# Patient Record
Sex: MALE | Race: WHITE
[De-identification: names, ages, dates, MRNs, and addresses within clinical notes are randomized per-mention and may not be internally consistent; named-entity substitution may affect disease eponyms.]

---

## 2019-03-26 ENCOUNTER — HOSPITAL ENCOUNTER (OUTPATIENT)
Dept: HOSPITAL 74 - FASUSAT | Age: 59
Discharge: HOME | End: 2019-03-26
Attending: ORTHOPAEDIC SURGERY
Payer: COMMERCIAL

## 2019-03-26 VITALS — DIASTOLIC BLOOD PRESSURE: 76 MMHG | SYSTOLIC BLOOD PRESSURE: 120 MMHG | HEART RATE: 66 BPM

## 2019-03-26 VITALS — TEMPERATURE: 97.7 F

## 2019-03-26 VITALS — BODY MASS INDEX: 27.6 KG/M2

## 2019-03-26 DIAGNOSIS — M17.11: Primary | ICD-10-CM

## 2019-03-26 DIAGNOSIS — Z53.8: ICD-10-CM

## 2019-03-26 PROCEDURE — 0SRC0LZ REPLACEMENT OF RIGHT KNEE JOINT WITH MEDIAL UNICONDYLAR SYNTHETIC SUBSTITUTE, OPEN APPROACH: ICD-10-PCS | Performed by: ORTHOPAEDIC SURGERY

## 2019-03-26 NOTE — HP
Satellite [facility]





- Chief Complaint


Chief Complaint: right knee pain





- Past Medical History


Allergies/Adverse Reactions: 


 Allergies











Allergy/AdvReac Type Severity Reaction Status Date / Time


 


shellfish derived Allergy Severe Difficulty Verified 03/18/19 12:15





   Breathing  


 


Penicillins Allergy Unknown  Verified 03/18/19 12:15














- Current Medications


Current Medications: 


 Home Medications











 Medication  Instructions  Recorded


 


Atorvastatin Ca [Lipitor] 40 mg PO HS 03/18/19


 


Biotin 2,500 mcg PO DAILY 03/18/19


 


Multivitamin [One-Daily 1 each PO DAILY 03/18/19





Multi-Vitamin]  














Satellite Physical Exam





- Physical Examination


General Appearance: Well Nourished, Well Developed, Alert & Oriented x3


ENT: Clear


Lung: Normal air movement


Heart: Regular rate & rhythm


Extremities: Other (right knee- + swelling, + ttp medially, decr rom, nvi xrays 

show grade 4 medial djd)


Neurological: Intact, Alert, Oriented





Satellite Impression/Plan





- Impression/Plan


Impression: right knee medial djd


Operative Procedure: right medial noemi ukr


Date to be Performed: 03/26/19

## 2019-03-26 NOTE — OP
DATE OF OPERATION:  03/26/2019

 

PREOPERATIVE DIAGNOSIS:  Degenerative joint disease, right knee.

 

POSTOPERATIVE DIAGNOSIS:  Degenerative joint disease, right knee.

 

PROCEDURE:  Attempted right unicompartmental knee replacement. 

 

SURGICAL ATTENDING:  Curt Oquendo MD

 

FIRST ASSISTANT:  ZACKARY Knapp

 

ANESTHESIA:  Regional and spinal.

 

DESCRIPTION OF OPERATIVE PROCEDURE:  Patient was taken to the operating room on March 26, 2019.  Regional and spinal anesthesia was performed by the anesthesiologist.  IV

Kefzol was administered prophylactically prior to the case.  We were about to begin

prepping the patient on the operating table when a power issue developed with the

JAYA robot with shrestha emanating from the power cord on the back part of the robot. 

The robot lost power and was not able to continue to perform the case.  We elected at

this time to abort the case despite the patient already receiving anesthesia due to

the case of the robot not functioning, and it was not a safe procedure.  We elected

to abort the case and to bring back the patient at a later date when the robot would

be functioning appropriately.  Patient was awakened from his sedation, transferred to

the recovery in stable condition.  

 

 

CURT OQUENDO M.D.

 

ANA CRISTINA/[id number]

DD: 03/26/2019 10:54

DT: 03/26/2019 11:07

Job #:  [id number]

## 2019-04-02 ENCOUNTER — HOSPITAL ENCOUNTER (OUTPATIENT)
Dept: HOSPITAL 74 - FM/S | Age: 59
LOS: 1 days | Discharge: HOME HEALTH | End: 2019-04-03
Attending: ORTHOPAEDIC SURGERY
Payer: COMMERCIAL

## 2019-04-02 VITALS — BODY MASS INDEX: 27.6 KG/M2

## 2019-04-02 DIAGNOSIS — M17.11: Primary | ICD-10-CM

## 2019-04-02 PROCEDURE — 0SRC0L9 REPLACEMENT OF RIGHT KNEE JOINT WITH MEDIAL UNICONDYLAR SYNTHETIC SUBSTITUTE, CEMENTED, OPEN APPROACH: ICD-10-PCS | Performed by: ORTHOPAEDIC SURGERY

## 2019-04-02 PROCEDURE — 8E0Y0CZ ROBOTIC ASSISTED PROCEDURE OF LOWER EXTREMITY, OPEN APPROACH: ICD-10-PCS | Performed by: ORTHOPAEDIC SURGERY

## 2019-04-02 PROCEDURE — 27446 REVISION OF KNEE JOINT: CPT

## 2019-04-02 PROCEDURE — 20985 CPTR-ASST DIR MS PX: CPT

## 2019-04-02 PROCEDURE — 8E0YXBZ COMPUTER ASSISTED PROCEDURE OF LOWER EXTREMITY: ICD-10-PCS | Performed by: ORTHOPAEDIC SURGERY

## 2019-04-02 RX ADMIN — OXYCODONE HYDROCHLORIDE SCH MG: 10 TABLET, FILM COATED, EXTENDED RELEASE ORAL at 21:21

## 2019-04-02 RX ADMIN — CELECOXIB ONE: 200 CAPSULE ORAL at 20:14

## 2019-04-02 RX ADMIN — DOCUSATE SODIUM,SENNOSIDES SCH TABLET: 50; 8.6 TABLET, FILM COATED ORAL at 21:22

## 2019-04-02 RX ADMIN — GABAPENTIN ONE: 300 CAPSULE ORAL at 20:14

## 2019-04-02 RX ADMIN — GABAPENTIN ONE MG: 300 CAPSULE ORAL at 08:19

## 2019-04-02 RX ADMIN — OXYCODONE HYDROCHLORIDE ONE MG: 10 TABLET, FILM COATED, EXTENDED RELEASE ORAL at 08:19

## 2019-04-02 RX ADMIN — ACETAMINOPHEN SCH: 325 TABLET ORAL at 20:15

## 2019-04-02 RX ADMIN — ACETAMINOPHEN SCH MG: 325 TABLET ORAL at 17:33

## 2019-04-02 RX ADMIN — ACETAMINOPHEN SCH MG: 325 TABLET ORAL at 23:23

## 2019-04-02 RX ADMIN — OXYCODONE HYDROCHLORIDE ONE: 10 TABLET, FILM COATED, EXTENDED RELEASE ORAL at 20:14

## 2019-04-02 RX ADMIN — CELECOXIB ONE MG: 200 CAPSULE ORAL at 08:19

## 2019-04-02 NOTE — SPEC
DATE OF OPERATION:  04/02/2019

 

PREOPERATIVE DIAGNOSIS:  Degenerative joint disease, right knee.

 

POSTOPERATIVE DIAGNOSIS:  Degenerative joint disease, right knee.

 

PROCEDURE:  Right medial unicompartmental knee replacement with robotic-assisted

navigation (MAKOplasty) and patelloplasty.

 

SURGICAL ATTENDING:  John Oquendo MD

 

FIRST ASSISTANT:  ZACKARY Knapp

 

ANESTHESIA:  Regional and spinal.

 

CLOSURE:  Medial JAYA components with a 6 femur, 6 tibia, and an 8 polyethylene; No.

1 Vicryl, fascia; 0 and 2-0 subcutaneous; 3-0 Monocryl subcuticular with skin glue

for skin; 4-0 undyed Vicryl for pin sites.

 

ESTIMATED BLOOD LOSS:  Less than 100 mL.

 

COMPLICATIONS:  None.

 

CONDITION:  To recovery in stable condition.

 

DESCRIPTION OF OPERATIVE PROCEDURE:  Patient was taken to the operating room on April 2, 2019.  Spinal and regional anesthesia was administered by the anesthesiologist. 

IV Kefzol and TXA were administered prophylactically prior to the case.  A

well-padded pneumatic tourniquet was placed on the right proximal thigh.   The right

lower extremity was prepped and draped in the usual sterile fashion.  A 6- to 8-cm

longitudinal incision over the medial side of the patella from mid patella to the

tibial tubercle was incised and was deepened using Bovie cautery.  An arthrotomy was

then made just medial to the patellar tendon and the patella.  Subperiosteal

dissection was done on the anteromedial proximal tibia all the way back to the MCL. 

Partial fat pad excision was performed, exposing the medial compartment.  Checkpoint

was malleable at both the femur and the tibia.  Using 2 stab incisions in the femur 1

handbreadth above the patella on the femur and 2 stab incisions 1 handbreadth below

the tibial tubercle on the tibia, 2 threaded pins were drilled in parallel fashion

from anterior to posterior, going through the proximal cortex and engaging the 2nd

but not through the 2nd cortex.  To these threaded pins were fastened navigation

rays, 1 on the femur and 1 on the tibia.  The knee was then registered with the

navigation device with the center of the rotation of the hip, medial and lateral

malleoli, and multiple points both on the femur and on the tibia.  Excellent

registration of less than 0.5 mm was obtained on both to ensure adequate

registration.  The navigation device ensured us to "pop the bubbles" both on the

femur and the tibia and that was performed and passed registration.

 

The knee was then thoroughly inspected to remove all osteophytes both on the femur

and the tibia.  Also, osteophytes on the trochlea and on the surface of the patella

were removed as well.  The knee was then stressed with valgus stress at 0, 30, 60,

90, and 120 degrees of flexion.  This propagated a looseness/tightness graft.  The

virtual positions of the components were then optimized to ensure an excellent graft.

 The tracking also was optimized by manipulating the virtual position to ensure that

the femoral component articulated with the central portion of the tibial component. 

The robot was then brought into the field and was registered.  The robot was used to

bur the bone on both the femur and the tibia as to the specifications of the

components.  The trial components were then applied on both the femur and the tibia

with an appropriate polyethylene insert.  The knee was taken through a range of

motion and found to have full extension, full flexion, with excellent stability. 

Stressing the graft revealed an excellent looseness/tightness graft with the trial

components in place.

 

The trial components were removed.  The knee was thoroughly irrigated with a copious

amount of antibiotic irrigation.  The real components were then cemented in using

modern generation cement techniques with antibiotic cement and pressurization.  After

the cement was hardened, the knee was thoroughly inspected to remove out all excess

cement.  The real polyethylene insert was then clipped into place.  Range of motion

and stability were again assessed to be as they were with the trials.  At this time,

the pins and the checkpoints were removed.  The knee was again thoroughly irrigated. 

The arthrotomy was closed with No. 1 Vicryl, 0 and 2-0 subcutaneous, and 3-0 Monocryl

subcuticular with skin glue for the skin, 4-0 undyed Vicryl for the pin sites. 

Sterile pressure dressing was placed over the knee.  Patient awakened from anesthesia

and transferred to recovery in stable condition.  No complications.  Estimated blood

loss negligible.  X-rays postoperatively revealed excellent position of the

components.

 

 

YOSELIN MASSEY/[id number]

DD: 04/02/2019 14:46

DT: 04/02/2019 17:40

Job #:  [id number]

## 2019-04-02 NOTE — HP
Satellite [facility]





- Chief Complaint


Chief Complaint: right knee pain





- Past Medical History


Allergies/Adverse Reactions: 


 Allergies











Allergy/AdvReac Type Severity Reaction Status Date / Time


 


shellfish derived Allergy Severe Difficulty Verified 04/02/19 08:20





   Breathing  


 


Penicillins Allergy Unknown  Verified 04/02/19 08:20














- Current Medications


Current Medications: 


 Home Medications











 Medication  Instructions  Recorded


 


Atorvastatin Ca [Lipitor] 40 mg PO HS 03/18/19


 


Biotin 2,500 mcg PO DAILY 03/18/19


 


Multivitamin [One-Daily 1 each PO DAILY 03/18/19





Multi-Vitamin]  














Satellite Physical Exam





- Physical Examination


Vital Signs: 


 Vital Signs











 Period  Temp  Pulse  Resp  BP Sys/Jones  Pulse Ox


 


 Last 24 Hr  98.0 F  60  16  132/74  100











General Appearance: Well Nourished, Well Developed, Alert & Oriented x3


ENT: Clear


Lung: Normal air movement


Heart: Regular rate & rhythm


Extremities: Other (right knee- + swelling, + ttp medially, decr rom, nvi xrays 

show grade 4 medial djd)


Neurological: Intact, Alert, Oriented





Satellite Impression/Plan





- Impression/Plan


Impression: right knee medial djd


Operative Procedure: right medial noemi ukr


Date to be Performed: 04/02/19

## 2019-04-02 NOTE — OP
Operative Note





- Note:


Operative Date: 04/02/19 (celeste)


Pre-Operative Diagnosis: right knee medial djd


Operation: right medial noemi ukr


Post-Operative Diagnosis: Same as Pre-op


Surgeon: John Oquendo


Assistant: Ender Vargas


Anesthesiologist/CRNA: Herminia Lr


Anesthesia: Spinal, Local


Specimens Removed: bone fragments


Estimated Blood Loss (mls): 100


Operative Report Dictated: Yes

## 2019-04-03 VITALS — DIASTOLIC BLOOD PRESSURE: 69 MMHG | TEMPERATURE: 98 F | SYSTOLIC BLOOD PRESSURE: 128 MMHG

## 2019-04-03 VITALS — HEART RATE: 67 BPM

## 2019-04-03 RX ADMIN — DOCUSATE SODIUM,SENNOSIDES SCH TABLET: 50; 8.6 TABLET, FILM COATED ORAL at 09:12

## 2019-04-03 RX ADMIN — OXYCODONE HYDROCHLORIDE SCH MG: 10 TABLET, FILM COATED, EXTENDED RELEASE ORAL at 09:12

## 2019-04-03 RX ADMIN — ACETAMINOPHEN SCH MG: 325 TABLET ORAL at 06:59

## 2019-04-03 RX ADMIN — ACETAMINOPHEN SCH: 325 TABLET ORAL at 12:40

## 2019-04-03 NOTE — PN
Progress Note (short form)





- Note


Progress Note: 





Ortho





Pt seen and examined s/p right medial noemi ukr pod #1





 Selected Entries











  04/03/19





  05:46


 


Temperature 98.0 F


 


Pulse Rate 67


 


Respiratory 18





Rate 


 


Blood Pressure 128/69








dressing c/d/i, calf soft, nt


rom 0-90, nvi





a/p


PT


dvt ppx


pain control


d/c home today


f/u in 1 week

## 2019-04-03 NOTE — DS
Physical Examination


Vital Signs: 


 Vital Signs











Temperature  98.0 F   04/03/19 05:46


 


Pulse Rate  67   04/03/19 05:46


 


Respiratory Rate  18   04/03/19 05:46


 


Blood Pressure  128/69   04/03/19 05:46


 


O2 Sat by Pulse Oximetry (%)  98   04/03/19 05:46














Discharge Summary


Reason For Visit: OSTEOARTHRITIS


Procedures: Principal: s/p right medial noemi ukr


Hospital Course: 





admitted for elective right medial noemi ukr, uneventful post-op, stable for d/c


Condition: Good





- Instructions


Diet, Activity, Other Instructions: 


Post-op Instructions-Partial Knee Replacement                                  

                 





     Call the office for a follow-up  appointment in 1 week - [phone]


     Aspirin 325mg daily for 6 weeks. Pain medication was sent into your 

pharmacy.                      


     Apply  Graduated Compression Stockings (TEDs) to both lower extremities-

remove daily


      for hygiene  ONLY


     Apply Sequential Compression Device (SCDs)  to both Lower extremities 

remove for PT 


      and hygiene ONLY  


     Apply cold packs to affected area for 15 minutes every 2 hours.


     Physical Therapist will come to your home for the first 5 days. You will 

be set up with 


      outpatient PT at your first post-operative visit.


     Patient may ambulate as tolerated-encourage self care (at least every 2-3 

hours while awake) 


      with walker or cane


     Maintain Aquacel (waterproof) dressing to operative wound (will be 

removed by surgeon at 


      first office visit)


     Shower with Aquacel dressing in place-if Aquacel integrity compromised, 

remove and apply 


      dry sterile dressing and notify Orthopedist.  DO NOT SHOWER unless 

Orthopedists approves without Aquacel 


      dressing





     CONTACT THE OFFICE FOR ANY CHANGE IN YOUR CONDITION (for example-fever 


      greater than 102 degrees, excessive bleeding from operative site, 

purulent drainage, 


      severe swelling or pain)    GO TO THE EMERGENCY ROOM IF THERE IS A  

MEDICAL EMERGENCY





     Knee Precautions:  


      * Keep a rolled towel under affected heel while in bed or chair (to keep 

knee 


        in extension)


      * Keep affected leg elevated except during mealtimes


      * DO NOT PLACE PILLOW UNDER AFFECTED KNEE





  * If you have any questions, please do not hesitate to call the office - [phone].





Referrals: 


John Oquendo MD [Staff Physician] - 


Disposition: [facility]/HOME HEALTH CARE





- Home Medications


Comprehensive Discharge Medication List: 


Ambulatory Orders





Atorvastatin Ca [Lipitor] 40 mg PO HS 03/18/19 


Biotin 2,500 mcg PO DAILY 03/18/19 


Multivitamin [One-Daily Multi-Vitamin] 1 each PO DAILY 03/18/19 


Oxycodone HCl/Acetaminophen [Percocet 5-325 mg Tablet -] 1 - 2 tab PO Q6H #50 

tab MDD 8 04/02/19

## 2021-02-24 ENCOUNTER — HOSPITAL ENCOUNTER (OUTPATIENT)
Dept: HOSPITAL 74 - JASU-SURG | Age: 61
Discharge: HOME | End: 2021-02-24
Attending: ORTHOPAEDIC SURGERY
Payer: COMMERCIAL

## 2021-02-24 VITALS — TEMPERATURE: 98.2 F | DIASTOLIC BLOOD PRESSURE: 72 MMHG | HEART RATE: 69 BPM | SYSTOLIC BLOOD PRESSURE: 129 MMHG

## 2021-02-24 VITALS — BODY MASS INDEX: 27.1 KG/M2

## 2021-02-24 DIAGNOSIS — M23.41: ICD-10-CM

## 2021-02-24 DIAGNOSIS — M23.91: Primary | ICD-10-CM

## 2021-02-24 PROCEDURE — 0SCC4ZZ EXTIRPATION OF MATTER FROM RIGHT KNEE JOINT, PERCUTANEOUS ENDOSCOPIC APPROACH: ICD-10-PCS | Performed by: ORTHOPAEDIC SURGERY

## 2021-02-24 PROCEDURE — 0SBC4ZZ EXCISION OF RIGHT KNEE JOINT, PERCUTANEOUS ENDOSCOPIC APPROACH: ICD-10-PCS | Performed by: ORTHOPAEDIC SURGERY
